# Patient Record
Sex: FEMALE | Race: WHITE | NOT HISPANIC OR LATINO | URBAN - METROPOLITAN AREA
[De-identification: names, ages, dates, MRNs, and addresses within clinical notes are randomized per-mention and may not be internally consistent; named-entity substitution may affect disease eponyms.]

---

## 2017-08-07 ENCOUNTER — OFFICE (OUTPATIENT)
Dept: URBAN - METROPOLITAN AREA CLINIC 10 | Facility: CLINIC | Age: 77
End: 2017-08-07
Payer: COMMERCIAL

## 2017-08-07 VITALS
HEIGHT: 63 IN | DIASTOLIC BLOOD PRESSURE: 81 MMHG | HEART RATE: 61 BPM | WEIGHT: 181 LBS | SYSTOLIC BLOOD PRESSURE: 139 MMHG

## 2017-08-07 DIAGNOSIS — I10 ESSENTIAL (PRIMARY) HYPERTENSION: ICD-10-CM

## 2017-08-07 DIAGNOSIS — K57.90 DIVERTICULOSIS OF INTESTINE, PART UNSPECIFIED, WITHOUT PERFO: ICD-10-CM

## 2017-08-07 DIAGNOSIS — J44.9 CHRONIC OBSTRUCTIVE PULMONARY DISEASE, UNSPECIFIED: ICD-10-CM

## 2017-08-07 DIAGNOSIS — K59.00 CONSTIPATION, UNSPECIFIED: ICD-10-CM

## 2017-08-07 DIAGNOSIS — R74.8 ABNORMAL LEVELS OF OTHER SERUM ENZYMES: ICD-10-CM

## 2017-08-07 DIAGNOSIS — E66.9 OBESITY, UNSPECIFIED: ICD-10-CM

## 2017-08-07 PROCEDURE — 99214 OFFICE O/P EST MOD 30 MIN: CPT

## 2017-08-07 PROCEDURE — G8427 DOCREV CUR MEDS BY ELIG CLIN: HCPCS

## 2017-08-07 RX ORDER — LINACLOTIDE 145 UG/1
145 CAPSULE, GELATIN COATED ORAL
Qty: 90 | Refills: 2 | Status: ACTIVE
Start: 2017-08-07

## 2017-08-07 NOTE — SERVICEHPINOTES
Luz Marina Sky   is a  76   year old   female   who is here today for a follow-up visit. She was last seen here on  12/19/2016   for a  First Visit  . She is in the office today for referral in regards to elevated alkaline phospahate and constipation issues. She reports having FLU in end of March and started having CP and SOB. She has been seeing [facility] and Echo performed last week. Pending CT scan of chest. She is to see a lung specialist at the end of the month. She reports unable to have bowel movement for a few days and was seen in [facility]. CT of abdomen performed. She has tried stool softeners, laxatives, and enemas with little relief. She does mention pressure pain to left and right lower quadrant when she feels like she is "backed up". Other times, reports pain to epigastric region and feels like food is all backed up. No dysphagia. No problems after eating. She is on omeprazole daily. Appetite is ok. Weight is down about 8 pounds. NO melena or hematochezia noted. Typically, stools were occurring everyday and now unable to go unless she takes something.  She has had current UTI and is on maintenance dose of  nitrofurantoin.    BRNo gallbladder, pancreas, liver, spleen, or kidney disease. NO new medication or dietary changes. Alk phos = 120 7/2/2017, Alk phos = 134 -8/1/2017. Alt and AST WNL. HGB 15.2, HCT 46, WBC 11.6.Last colonoscopy completed in 2009, diverticular disease. No polyps. internal and external hemorrhoids.

## 2017-08-09 LAB
ALKALINE PHOSPHATASE ISOENZ: ALKALINE PHOS TOTAL: 120 U/L (ref 40–120)
ALKALINE PHOSPHATASE ISOENZ: BONE %: 51.2 % (ref 19.1–67.7)
ALKALINE PHOSPHATASE ISOENZ: BONE: 61.4 U/L (ref 7.6–81.2)
ALKALINE PHOSPHATASE ISOENZ: INTESTINE %: 2.5 % (ref 0–20.6)
ALKALINE PHOSPHATASE ISOENZ: INTESTINE: 3 U/L (ref 0–24.7)
ALKALINE PHOSPHATASE ISOENZ: LIVER %: 36.5 % (ref 27.8–76.3)
ALKALINE PHOSPHATASE ISOENZ: LIVER 1: 43.8 U/L (ref 10.9–91.6)
ALKALINE PHOSPHATASE ISOENZ: LIVER 2%: 9.8 % — HIGH (ref 0–8)
ALKALINE PHOSPHATASE ISOENZ: LIVER 2: 11.8 U/L — HIGH (ref 0–9.6)
ALKALINE PHOSPHATASE ISOENZ: PLACENTAL %: 0 %
ALKALINE PHOSPHATASE ISOENZ: PLACENTAL: 0 U/L

## 2017-09-18 ENCOUNTER — OFFICE (OUTPATIENT)
Dept: URBAN - METROPOLITAN AREA CLINIC 10 | Facility: CLINIC | Age: 77
End: 2017-09-18
Payer: COMMERCIAL

## 2017-09-18 VITALS
SYSTOLIC BLOOD PRESSURE: 138 MMHG | HEIGHT: 63 IN | HEART RATE: 70 BPM | DIASTOLIC BLOOD PRESSURE: 85 MMHG | WEIGHT: 183 LBS

## 2017-09-18 DIAGNOSIS — R74.8 ABNORMAL LEVELS OF OTHER SERUM ENZYMES: ICD-10-CM

## 2017-09-18 LAB — ALKALINE PHOSPHATASE, S: 112 IU/L (ref 39–117)

## 2017-09-18 PROCEDURE — G8427 DOCREV CUR MEDS BY ELIG CLIN: HCPCS | Performed by: INTERNAL MEDICINE

## 2017-09-18 PROCEDURE — 99213 OFFICE O/P EST LOW 20 MIN: CPT | Performed by: INTERNAL MEDICINE
